# Patient Record
Sex: MALE | Race: OTHER | HISPANIC OR LATINO | Employment: FULL TIME | URBAN - METROPOLITAN AREA
[De-identification: names, ages, dates, MRNs, and addresses within clinical notes are randomized per-mention and may not be internally consistent; named-entity substitution may affect disease eponyms.]

---

## 2024-06-14 ENCOUNTER — APPOINTMENT (OUTPATIENT)
Dept: RADIOLOGY | Facility: HOSPITAL | Age: 34
End: 2024-06-14

## 2024-06-14 ENCOUNTER — HOSPITAL ENCOUNTER (EMERGENCY)
Facility: HOSPITAL | Age: 34
Discharge: HOME | End: 2024-06-14
Attending: EMERGENCY MEDICINE

## 2024-06-14 VITALS
BODY MASS INDEX: 23.7 KG/M2 | WEIGHT: 147.49 LBS | HEART RATE: 74 BPM | DIASTOLIC BLOOD PRESSURE: 77 MMHG | HEIGHT: 66 IN | RESPIRATION RATE: 16 BRPM | TEMPERATURE: 97.7 F | SYSTOLIC BLOOD PRESSURE: 120 MMHG | OXYGEN SATURATION: 98 %

## 2024-06-14 DIAGNOSIS — S09.90XA HEAD INJURY, INITIAL ENCOUNTER: ICD-10-CM

## 2024-06-14 DIAGNOSIS — S43.401A SPRAIN OF RIGHT SHOULDER, UNSPECIFIED SHOULDER SPRAIN TYPE, INITIAL ENCOUNTER: ICD-10-CM
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
## 2024-06-14 DIAGNOSIS — S01.01XA LACERATION OF SCALP, INITIAL ENCOUNTER: ICD-10-CM

## 2024-06-14 PROCEDURE — 12001 RPR S/N/AX/GEN/TRNK 2.5CM/<: CPT | Performed by: CLINICAL NURSE SPECIALIST

## 2024-06-14 PROCEDURE — 2500000005 HC RX 250 GENERAL PHARMACY W/O HCPCS: Performed by: CLINICAL NURSE SPECIALIST

## 2024-06-14 PROCEDURE — 70450 CT HEAD/BRAIN W/O DYE: CPT | Performed by: RADIOLOGY

## 2024-06-14 PROCEDURE — 70450 CT HEAD/BRAIN W/O DYE: CPT

## 2024-06-14 PROCEDURE — 73030 X-RAY EXAM OF SHOULDER: CPT | Mod: RIGHT SIDE | Performed by: RADIOLOGY

## 2024-06-14 PROCEDURE — 71046 X-RAY EXAM CHEST 2 VIEWS: CPT | Performed by: RADIOLOGY

## 2024-06-14 PROCEDURE — 90715 TDAP VACCINE 7 YRS/> IM: CPT | Performed by: CLINICAL NURSE SPECIALIST

## 2024-06-14 PROCEDURE — 72125 CT NECK SPINE W/O DYE: CPT | Performed by: RADIOLOGY

## 2024-06-14 PROCEDURE — 73030 X-RAY EXAM OF SHOULDER: CPT | Mod: RT

## 2024-06-14 PROCEDURE — 2500000004 HC RX 250 GENERAL PHARMACY W/ HCPCS (ALT 636 FOR OP/ED): Performed by: CLINICAL NURSE SPECIALIST

## 2024-06-14 PROCEDURE — 72125 CT NECK SPINE W/O DYE: CPT

## 2024-06-14 PROCEDURE — 90471 IMMUNIZATION ADMIN: CPT | Performed by: CLINICAL NURSE SPECIALIST

## 2024-06-14 PROCEDURE — 71046 X-RAY EXAM CHEST 2 VIEWS: CPT

## 2024-06-14 PROCEDURE — 99285 EMERGENCY DEPT VISIT HI MDM: CPT | Mod: 25

## 2024-06-14 RX ORDER — NAPROXEN 500 MG/1
500 TABLET ORAL
Qty: 14 TABLET | Refills: 0 | Status: SHIPPED | OUTPATIENT
Start: 2024-06-14 | End: 2024-06-21

## 2024-06-14 RX ORDER — ACETAMINOPHEN 325 MG/1
650 TABLET ORAL ONCE
Status: COMPLETED | OUTPATIENT
Start: 2024-06-14 | End: 2024-06-14

## 2024-06-14 RX ORDER — LIDOCAINE HYDROCHLORIDE 10 MG/ML
5 INJECTION INFILTRATION; PERINEURAL ONCE
Status: COMPLETED | OUTPATIENT
Start: 2024-06-14 | End: 2024-06-14

## 2024-06-14 ASSESSMENT — PAIN DESCRIPTION - FREQUENCY: FREQUENCY: CONSTANT/CONTINUOUS

## 2024-06-14 ASSESSMENT — PAIN DESCRIPTION - ONSET: ONSET: GRADUAL

## 2024-06-14 ASSESSMENT — PAIN - FUNCTIONAL ASSESSMENT: PAIN_FUNCTIONAL_ASSESSMENT: 0-10

## 2024-06-14 ASSESSMENT — PAIN DESCRIPTION - PROGRESSION: CLINICAL_PROGRESSION: GRADUALLY WORSENING

## 2024-06-14 ASSESSMENT — COLUMBIA-SUICIDE SEVERITY RATING SCALE - C-SSRS
1. IN THE PAST MONTH, HAVE YOU WISHED YOU WERE DEAD OR WISHED YOU COULD GO TO SLEEP AND NOT WAKE UP?: NO
2. HAVE YOU ACTUALLY HAD ANY THOUGHTS OF KILLING YOURSELF?: NO
6. HAVE YOU EVER DONE ANYTHING, STARTED TO DO ANYTHING, OR PREPARED TO DO ANYTHING TO END YOUR LIFE?: NO

## 2024-06-14 ASSESSMENT — PAIN DESCRIPTION - DESCRIPTORS: DESCRIPTORS: BURNING

## 2024-06-14 ASSESSMENT — PAIN DESCRIPTION - LOCATION: LOCATION: HEAD

## 2024-06-14 ASSESSMENT — PAIN SCALES - GENERAL: PAINLEVEL_OUTOF10: 6

## 2024-06-14 ASSESSMENT — PAIN DESCRIPTION - PAIN TYPE: TYPE: ACUTE PAIN

## 2024-06-14 NOTE — ED PROVIDER NOTES
THIS IS MY RICHIE/resident SUPERVISORY AND SHARED VISIT NOTE:    I personally saw the patient and made/approved the management plan and take responsibility for the patient management.    History: Fell right shoulder pain fell approximately 5 steps.  Exam: Neurologic examination: Patient is alert, motor examination is normal, sensory examination is normal, gait is intact.  Musculoskeletal examination: Able to do a duction and raising of the shoulder.  Normal range of motion of the elbow normal range of motion of the wrist normal range of motion of the hand.    MDM: CT scans are okay x-rays okay.  Sling ordered.Discharge home.  Follow-up with orthopedics            Conrado Baeza MD  06/14/24 7762

## 2024-06-14 NOTE — ED PROVIDER NOTES
Department of Emergency Medicine   ED  Provider Note  Admit Date/RoomTime: 6/14/2024  2:26 PM  ED Room: ST29/ST29        History of Present Illness:  Chief Complaint   Patient presents with    Fall     Tripped fell down 5 steps no LOC, right shoulder pain, 2 1 inch head lacs, bleeding controlled, no neck or back pain, no thinners, no LOC, good MSPs         Rohit Urbina is a 34 y.o. male denies chronic medical illnesses presents to the emergency department complaints of fall down 5 steps.  Reports he tripped and fell down 5 steps hitting the back of his head and his right shoulder.  Denies loss of consciousness.  No difficulty moving his arms or legs.  No chest pain or shortness of breath no dizziness.  No nausea or vomiting.  Bleeding is controlled to the back of his head.  He is unsure when his last tetanus shot was given.  He is on no blood thinners.  Presents now for evaluation of his head injury and right shoulder pain.  Onset of symptoms about 40 minutes prior to arrival   review of Systems:   Pertinent positives and negatives are stated within HPI, all other systems reviewed and are negative.        --------------------------------------------- PAST HISTORY ---------------------------------------------  Past Medical History:  has no past medical history on file.  Past Surgical History:  has no past surgical history on file.  Social History:    Family History: family history is not on file.. Unless otherwise noted, family history is non contributory  The patient’s home medications have been reviewed.  Allergies: Patient has no known allergies.        ---------------------------------------------------PHYSICAL EXAM--------------------------------------    GENERAL APPEARANCE: Awake and alert.   VITAL SIGNS: As per the nurses' triage record.   HEENT: Normocephalic.  Large hematoma behind the right ear.  Horizontal gaping laceration to the posterior scalp 1 and half centimeters in length bleeding controlled no  raccoon eyes . No epistaxis noted.  No contusions lacerations noted to the face.  No epistaxis noted.  No bite to the tongue or lip.  Teeth are intact.  Extraocular muscles are intact. Pupils equal round and reactive to light. Conjunctiva are pink. Negative scleral icterus. Mucous membranes are moist. Tongue in the midline. Pharynx was without erythema or exudates, uvula midline  NECK: Soft Nontender and supple, full gross ROM, no meningeal signs.  No pain palpation of cervical spine no step-offs crepitus or bruising full range of motion without pain trachea midline no stridor or trismus noted.  CHEST: Nontender to palpation. Clear to auscultation bilaterally.  No flail chest noted no rales, rhonchi, or wheezing.   HEART: S1, S2. Regular rate and rhythm. No murmurs, gallops or rubs.  Strong and equal pulses in the extremities.   ABDOMEN: Soft, nontender, nondistended, positive bowel sounds, no palpable masses.  MUSCULCSKELETAL:   Right upper extremity patient has pain with palpation of the anterior and posterior shoulder.  No bruising or edema no pain palpation over the clavicle shoulders are symmetrical.  Biceps appears to be intact no pain to palpation manipulation of the right elbow wrist or hand handgrip is strong hand spread is strong.  Able to tap the left shoulder with the right hand.  Able to raise the arm above the head with reported pain.  Unable to perform liftoff test.  Radial pulse strong and intact cap refill less than 2 sensation intact.  Able to perform okay sign.  Able to give a thumbs up sign.  Left upper extremity patient moves with no difficulty.  Lower extremities with no difficulty peripheral pulses all intact sensation intact.  No peripheral edema noted  NEUROLOGICAL: Awake, alert and oriented x 3. Power intact in the upper and lower extremities. Sensation is intact to light touch in the upper and lower extremities.   IMMUNOLOGICAL: No lymphatic streaking noted   DERM: No petechiae,  "milana,          ------------------------- NURSING NOTES AND VITALS REVIEWED ---------------------------  The nursing notes within the ED encounter and vital signs as below have been reviewed by myself  /77 (BP Location: Right arm, Patient Position: Sitting)   Pulse 74   Temp 36.5 °C (97.7 °F) (Oral)   Resp 16   Ht 1.676 m (5' 6\")   Wt 66.9 kg (147 lb 7.8 oz)   SpO2 98%   BMI 23.81 kg/m²     Oxygen Saturation Interpretation: 98% room air      The patient’s available past medical records and past encounters were reviewed.          -----------------------DIAGNOSTIC RESULTS------------------------  LABS:    Labs Reviewed - No data to display    As interpreted by me, the above displayed labs are abnormal. All other labs obtained during this visit were within normal range or not returned as of this dictation.      CT head wo IV contrast   Final Result   No CT evidence of acute intracranial injury.                  MACRO:   None                  Signed by: Marcel Pineda 6/14/2024 3:23 PM   Dictation workstation:   [id number]      CT cervical spine wo IV contrast   Final Result   No evidence for an acute fracture or subluxation of the cervical   spine.        MACRO:   None        Signed by: Marcel Pineda 6/14/2024 3:24 PM   Dictation workstation:   [id number]      XR shoulder right 2+ views   Final Result   No acute osseous abnormality of the shoulder             MACRO:   None        Signed by: Marcel Pineda 6/14/2024 3:24 PM   Dictation workstation:   [id number]      XR chest 2 views   Final Result   1.  No evidence of acute cardiopulmonary process.             Signed by: Marcel Pineda 6/14/2024 3:24 PM   Dictation workstation:   [id number]              CT head wo IV contrast   Final Result   No CT evidence of acute intracranial injury.                  MACRO:   None                  Signed by: Marcel Pineda 6/14/2024 3:23 PM   Dictation workstation:   [id number]      CT cervical spine wo IV " contrast   Final Result   No evidence for an acute fracture or subluxation of the cervical   spine.        MACRO:   None        Signed by: Marcel Pineda 6/14/2024 3:24 PM   Dictation workstation:   [id number]      XR shoulder right 2+ views   Final Result   No acute osseous abnormality of the shoulder             MACRO:   None        Signed by: Marcel Pineda 6/14/2024 3:24 PM   Dictation workstation:   [id number]      XR chest 2 views   Final Result   1.  No evidence of acute cardiopulmonary process.             Signed by: Marcel Pnieda 6/14/2024 3:24 PM   Dictation workstation:   [id number]              ------------------------------ ED COURSE/MEDICAL DECISION MAKING----------------------  Medical Decision Making:   Exam: A medically appropriate exam performed, outlined above, given the known history and presentation.    History obtained from: Review of medical record nursing notes patient      Social Determinants of Health considered during this visit: Takes care of himself at home      PAST MEDICAL HISTORY/Chronic Conditions Affecting Care     has no past medical history on file.       CC/HPI Summary, Social Determinants of health, Records Reviewed, DDx, testing done/not done, ED Course, Reassessment, disposition considerations/shared decision making with patient, consults, disposition:   Presents with what sounds like a mechanical fall after tripping falling down 5 steps hitting the back of his head and complaining of right shoulder pain  Laceration repair  Tylenol  Tdap CT cervical No evidence for an acute fracture or subluxation of the cervical  spine.  CT head No CT evidence of acute intracranial injury.   Chest x-ray 1. No evidence of acute cardiopulmonary process.   Right shoulder x-ray No acute osseous abnormality of the shoulder   Medical Decision Making/Differential Diagnosis:  Differentials include not limited to closed head injury versus concussion versus intercranial bleed versus cervical strain  versus cervical fracture versus shoulder sprain versus shoulder fracture  Patient presents to the emergency department with complaints of mechanical fall slipping and falling down 5 steps.  No reported loss of consciousness.  CT of the head showed no evidence of acute intercranial abnormality.  Patient has a large hematoma behind the right ear and a gaping laceration to the posterior scalp which will require suture repair please see procedure note.  Cervical spine no evidence of acute fracture or subluxation of the cervical spine.  Tetanus shot was updated.  Chest x-ray showed no evidence of acute cardiopulmonary process.  Patient medicated with Tylenol for pain x-ray of the right shoulder showed no acute osseous abnormality.  Based on clinical presentation history and symptoms consistent with right shoulder sprain.  Ligamental injury or rotator cuff injury cannot be completely ruled out referral to orthopedics.  Sling ordered.  Advised on supportive care measures at home ice rest elevate Tylenol Motrin for pain.  Prescription for naproxen also provided.  Also discussed case with trauma surgeon.  Based on patient's clinical presentation history and symptoms consistent with close head injury scalp laceration shoulder sprain status post fall  Patient seen and evaluated with attending physician Dr. Baeza   Advised on signs and symptoms of infection and close head injury.  Advised on supportive care measures at home sutures to be removed in 7 to 10 days patient and family verbalized understanding amenable to discharge ambulatory remains neurologically intact  CMT for discharge utilized for discharge planning  PROCEDURES  Unless otherwise noted below, none  Laceration Repair    Performed by: YINKA Fabian-CNP  Authorized by: Conrado Baeza MD    Consent:     Consent obtained:  Verbal    Consent given by:  Patient    Risks, benefits, and alternatives were discussed: yes      Risks discussed:  Infection, pain,  vascular damage, poor wound healing, poor cosmetic result, nerve damage and need for additional repair    Alternatives discussed:  Observation  Universal protocol:     Procedure explained and questions answered to patient or proxy's satisfaction: yes      Relevant documents present and verified: yes      Test results available: yes      Imaging studies available: yes      Required blood products, implants, devices, and special equipment available: yes      Site/side marked: yes      Immediately prior to procedure, a time out was called: yes      Patient identity confirmed:  Verbally with patient and arm band  Anesthesia:     Anesthesia method:  Local infiltration    Local anesthetic:  Lidocaine 1% w/o epi  Laceration details:     Location:  Scalp    Scalp location:  Occipital    Length (cm):  1.5    Depth (mm):  0.5  Pre-procedure details:     Preparation:  Patient was prepped and draped in usual sterile fashion and imaging obtained to evaluate for foreign bodies  Exploration:     Limited defect created (wound extended): no      Hemostasis achieved with:  Direct pressure    Imaging obtained: x-ray      Imaging outcome: foreign body not noted      Wound exploration: wound explored through full range of motion and entire depth of wound visualized      Contaminated: no    Treatment:     Area cleansed with:  Chlorhexidine    Amount of cleaning:  Extensive    Irrigation solution:  Sterile saline    Irrigation volume:  Copious    Irrigation method:  Syringe    Debridement:  None    Undermining:  None    Scar revision: no    Skin repair:     Repair method:  Sutures    Suture size:  4-0    Suture material:  Prolene    Suture technique:  Simple interrupted    Number of sutures:  4  Approximation:     Approximation:  Close  Repair type:     Repair type:  Simple  Post-procedure details:     Dressing:  Antibiotic ointment    Procedure completion:  Tolerated well, no immediate complications  Comments:      Patient placed in  position of comfort.  Sterilely draped.  Allergies reviewed.  Tetanus shot updated in the emergency department.  Wound cleansed with chlorhexidine.  Using 1% lidocaine without epinephrine approximately 1.5 cc was injected around the wound edges to keep adequate anesthesia patient tolerated well.  Baseline visualized in a bloodless field.  No foreign body noted.  No hematoma noted.  Irrigated with copious amounts of normal saline.  Galea is intact.  Using 4-0 Prolene 4 simple interrupted sutures were used to approximate the wound closely.  Patient tolerated well.  Bacitracin applied.  Advised to have sutures removed in 7 to 10 days.  It is okay to wash the hair with warm water today and then shampoo tomorrow.  Triple antibiotic ointment twice a day.  Patient being neurologically intact able to open and shut the eyes with no difficulty.  Follows commands.  Before and after procedure.       CONSULTS:   None  Trauma surgeon Dr. Castellon     Diagnoses as of 06/14/24 1829   Fall, initial encounter   Head injury, initial encounter   Sprain of right shoulder, unspecified shoulder sprain type, initial encounter   Laceration of scalp, initial encounter         This patient has remained hemodynamically stable during their ED course.      Critical Care: None      Counseling:  The emergency provider has spoken with the patient family and discussed today’s results, in addition to providing specific details for the plan of care and counseling regarding the diagnosis and prognosis.  Questions are answered at this time and they are agreeable with the plan.         --------------------------------- IMPRESSION AND DISPOSITION ---------------------------------    IMPRESSION  1. Fall, initial encounter    2. Head injury, initial encounter    3. Sprain of right shoulder, unspecified shoulder sprain type, initial encounter    4. Laceration of scalp, initial encounter        DISPOSITION  Disposition: Discharge home  Patient condition is  stable improved        NOTE: This report was transcribed using voice recognition software. Every effort was made to ensure accuracy; however, inadvertent computerized transcription errors may be present      YINKA Fabian-HUSSEIN  06/14/24 [id number]

## 2024-06-14 NOTE — DISCHARGE INSTRUCTIONS
Brain rest limit your TV time and screen time  Ice 20 minutes at a time 4 times a day and after activity   Ice to the hematomas of the scalp to help with swelling and pain control 20 minutes at a time 4 times a day  Avoid any further head injuries  Avoid stressful activity  Tylenol for pain use as directed do not go the recommended daily dosage  Have sutures removed in 7 to 10 days.  Triple antibiotic ointment twice a day.  It is okay to shower and wash your hair with warm water today and then shampoo tomorrow.  No soaking the head in water no swimming  For stitches was placed in the back of your head  Your tetanus shot was updated today  Return with any worsening symptoms or concerns

## 2024-06-14 NOTE — Clinical Note
Rohit Urbina was seen and treated in our emergency department on 6/14/2024.  He may return to work on 06/17/2024.  1-3 days if needed     If you have any questions or concerns, please don't hesitate to call.      Conrado Baeza MD